# Patient Record
Sex: FEMALE | Race: WHITE | NOT HISPANIC OR LATINO
[De-identification: names, ages, dates, MRNs, and addresses within clinical notes are randomized per-mention and may not be internally consistent; named-entity substitution may affect disease eponyms.]

---

## 2021-05-04 PROBLEM — Z00.00 ENCOUNTER FOR PREVENTIVE HEALTH EXAMINATION: Status: ACTIVE | Noted: 2021-05-04

## 2021-06-01 ENCOUNTER — APPOINTMENT (OUTPATIENT)
Dept: HEART AND VASCULAR | Facility: CLINIC | Age: 81
End: 2021-06-01
Payer: MEDICARE

## 2021-06-01 VITALS
WEIGHT: 140 LBS | TEMPERATURE: 98.3 F | HEART RATE: 83 BPM | BODY MASS INDEX: 27.48 KG/M2 | SYSTOLIC BLOOD PRESSURE: 154 MMHG | HEIGHT: 60 IN | DIASTOLIC BLOOD PRESSURE: 78 MMHG | OXYGEN SATURATION: 97 %

## 2021-06-01 PROCEDURE — 93280 PM DEVICE PROGR EVAL DUAL: CPT

## 2021-08-02 ENCOUNTER — APPOINTMENT (OUTPATIENT)
Dept: HEART AND VASCULAR | Facility: CLINIC | Age: 81
End: 2021-08-02
Payer: MEDICARE

## 2021-08-02 ENCOUNTER — NON-APPOINTMENT (OUTPATIENT)
Age: 81
End: 2021-08-02

## 2021-08-02 PROCEDURE — 93294 REM INTERROG EVL PM/LDLS PM: CPT

## 2021-08-02 PROCEDURE — 93296 REM INTERROG EVL PM/IDS: CPT

## 2021-08-16 RX ORDER — LEVOTHYROXINE SODIUM 0.05 MG/1
50 TABLET ORAL
Refills: 0 | Status: ACTIVE | COMMUNITY

## 2021-08-16 RX ORDER — AMLODIPINE BESYLATE 5 MG/1
5 TABLET ORAL
Refills: 0 | Status: ACTIVE | COMMUNITY

## 2021-08-16 RX ORDER — MULTIVIT-MIN/FOLIC/VIT K/LYCOP 400-300MCG
25 MCG TABLET ORAL
Refills: 0 | Status: ACTIVE | COMMUNITY

## 2021-08-16 NOTE — HISTORY OF PRESENT ILLNESS
[de-identified] : 82 yo female with hypertension, hyperlipidemia, prediabetes, hypothyroid, atrial fibrillation who underwent pacemaker implant 5/3/2021 for sinus node dysfunction.  She presents for follow-up.  No complaints.  She is feeling better since implant.  No fever or chills.

## 2021-08-16 NOTE — PHYSICAL EXAM
[General Appearance - Well Nourished] : well nourished [General Appearance - In No Acute Distress] : no acute distress [Left Infraclavicular] : left infraclavicular area [Clean] : clean [Dry] : dry [Healing Well] : healing well [Bleeding] : no active bleeding [Purulent Drainage] : no purulent drainage [Erythema] : not erythematous [Warm] : not warm [Tender] : not tender

## 2021-08-16 NOTE — PROCEDURE
[No] : not [Sinus Bradycardia] : sinus bradycardia [See Device Printout] : See device printout [Pacemaker] : pacemaker [de-identified] : [facility] [de-identified] : [id number] [de-identified] : [id number] [de-identified] : 05/03/2021

## 2021-11-01 ENCOUNTER — NON-APPOINTMENT (OUTPATIENT)
Age: 81
End: 2021-11-01

## 2021-11-01 ENCOUNTER — APPOINTMENT (OUTPATIENT)
Dept: HEART AND VASCULAR | Facility: CLINIC | Age: 81
End: 2021-11-01
Payer: MEDICARE

## 2021-11-01 PROCEDURE — 93294 REM INTERROG EVL PM/LDLS PM: CPT | Mod: NC

## 2021-11-01 PROCEDURE — 93296 REM INTERROG EVL PM/IDS: CPT

## 2021-12-07 ENCOUNTER — APPOINTMENT (OUTPATIENT)
Dept: HEART AND VASCULAR | Facility: CLINIC | Age: 81
End: 2021-12-07
Payer: MEDICARE

## 2021-12-07 VITALS
OXYGEN SATURATION: 98 % | WEIGHT: 138 LBS | TEMPERATURE: 98.7 F | SYSTOLIC BLOOD PRESSURE: 160 MMHG | BODY MASS INDEX: 27.09 KG/M2 | HEART RATE: 78 BPM | HEIGHT: 60 IN | DIASTOLIC BLOOD PRESSURE: 70 MMHG

## 2021-12-07 PROCEDURE — 99213 OFFICE O/P EST LOW 20 MIN: CPT | Mod: 25

## 2021-12-07 PROCEDURE — 93280 PM DEVICE PROGR EVAL DUAL: CPT

## 2022-02-22 NOTE — PHYSICAL EXAM
[General Appearance - In No Acute Distress] : no acute distress [General Appearance - Well Nourished] : well nourished [Left Infraclavicular] : left infraclavicular area [Clean] : clean [Dry] : dry [Well-Healed] : well-healed [Bleeding] : no active bleeding [Purulent Drainage] : no purulent drainage [Erythema] : not erythematous [Warm] : not warm [Tender] : not tender

## 2022-02-22 NOTE — HISTORY OF PRESENT ILLNESS
[None] : The patient complains of no symptoms [de-identified] : 80 yo female with hypertension, hyperlipidemia, prediabetes, hypothyroid, atrial fibrillation who underwent pacemaker implant 5/3/2021 for sinus node dysfunction.  She presents for follow-up.  No complaints\par Atrial threshold is slightly increased since last visit.  Otherwise normal pacemaker function.  No AF since last check.

## 2022-03-08 ENCOUNTER — APPOINTMENT (OUTPATIENT)
Dept: HEART AND VASCULAR | Facility: CLINIC | Age: 82
End: 2022-03-08
Payer: MEDICARE

## 2022-03-08 ENCOUNTER — NON-APPOINTMENT (OUTPATIENT)
Age: 82
End: 2022-03-08

## 2022-03-08 PROCEDURE — 93294 REM INTERROG EVL PM/LDLS PM: CPT

## 2022-03-08 PROCEDURE — 93296 REM INTERROG EVL PM/IDS: CPT

## 2022-06-07 ENCOUNTER — APPOINTMENT (OUTPATIENT)
Dept: HEART AND VASCULAR | Facility: CLINIC | Age: 82
End: 2022-06-07
Payer: MEDICARE

## 2022-06-07 VITALS
HEIGHT: 60 IN | SYSTOLIC BLOOD PRESSURE: 145 MMHG | DIASTOLIC BLOOD PRESSURE: 70 MMHG | OXYGEN SATURATION: 98 % | BODY MASS INDEX: 26.9 KG/M2 | TEMPERATURE: 97.8 F | WEIGHT: 137 LBS | HEART RATE: 80 BPM

## 2022-06-07 PROCEDURE — 93280 PM DEVICE PROGR EVAL DUAL: CPT

## 2022-06-07 PROCEDURE — 99213 OFFICE O/P EST LOW 20 MIN: CPT | Mod: 25

## 2022-06-07 NOTE — PHYSICAL EXAM
[General Appearance - Well Nourished] : well nourished [General Appearance - In No Acute Distress] : no acute distress [Heart Rate And Rhythm] : heart rate and rhythm were normal [Heart Sounds] : normal S1 and S2 [Murmurs] : no murmurs present [Respiration, Rhythm And Depth] : normal respiratory rhythm and effort [Auscultation Breath Sounds / Voice Sounds] : lungs were clear to auscultation bilaterally [Left Infraclavicular] : left infraclavicular area [Clean] : clean [Dry] : dry [Well-Healed] : well-healed [Abdomen Soft] : soft [Abdomen Tenderness] : non-tender [Erythema] : not erythematous [Warm] : not warm [Tender] : not tender

## 2022-06-07 NOTE — HISTORY OF PRESENT ILLNESS
[None] : The patient complains of no symptoms [de-identified] : 80 yo female with hypertension, hyperlipidemia, prediabetes, hypothyroid, atrial fibrillation who underwent pacemaker implant 5/3/2021 for sinus node dysfunction.  She presents for follow-up.  She notes feeling tired sometimes, sometimes not having energy to do stuff and sometimes getting tired more easily.  She denies palpitations, dizziness, or syncope.\par Pacemaker interrogation today shows normal function and no recent arrhythmias.

## 2022-06-07 NOTE — REVIEW OF SYSTEMS
[Feeling Fatigued] : feeling fatigued [Fever] : no fever [Chills] : no chills [SOB] : no shortness of breath [Dyspnea on exertion] : not dyspnea during exertion [Syncope] : no syncope [Abdominal Pain] : no abdominal pain [Nausea] : no nausea

## 2022-09-06 ENCOUNTER — APPOINTMENT (OUTPATIENT)
Dept: HEART AND VASCULAR | Facility: CLINIC | Age: 82
End: 2022-09-06

## 2022-09-06 ENCOUNTER — NON-APPOINTMENT (OUTPATIENT)
Age: 82
End: 2022-09-06

## 2022-09-06 PROCEDURE — 93294 REM INTERROG EVL PM/LDLS PM: CPT

## 2022-09-06 PROCEDURE — 93296 REM INTERROG EVL PM/IDS: CPT

## 2022-12-12 ENCOUNTER — NON-APPOINTMENT (OUTPATIENT)
Age: 82
End: 2022-12-12

## 2022-12-12 ENCOUNTER — APPOINTMENT (OUTPATIENT)
Dept: HEART AND VASCULAR | Facility: CLINIC | Age: 82
End: 2022-12-12

## 2022-12-12 PROCEDURE — 93296 REM INTERROG EVL PM/IDS: CPT

## 2022-12-12 PROCEDURE — 93294 REM INTERROG EVL PM/LDLS PM: CPT

## 2023-03-13 ENCOUNTER — APPOINTMENT (OUTPATIENT)
Dept: HEART AND VASCULAR | Facility: CLINIC | Age: 83
End: 2023-03-13
Payer: MEDICARE

## 2023-03-13 ENCOUNTER — NON-APPOINTMENT (OUTPATIENT)
Age: 83
End: 2023-03-13

## 2023-03-13 PROCEDURE — 93296 REM INTERROG EVL PM/IDS: CPT

## 2023-03-13 PROCEDURE — 93294 REM INTERROG EVL PM/LDLS PM: CPT

## 2023-05-29 ENCOUNTER — TRANSCRIPTION ENCOUNTER (OUTPATIENT)
Age: 83
End: 2023-05-29

## 2023-06-06 ENCOUNTER — APPOINTMENT (OUTPATIENT)
Dept: HEART AND VASCULAR | Facility: CLINIC | Age: 83
End: 2023-06-06
Payer: MEDICARE

## 2023-06-06 VITALS
SYSTOLIC BLOOD PRESSURE: 155 MMHG | OXYGEN SATURATION: 98 % | WEIGHT: 122 LBS | HEIGHT: 60 IN | BODY MASS INDEX: 23.95 KG/M2 | TEMPERATURE: 98.7 F | HEART RATE: 97 BPM | DIASTOLIC BLOOD PRESSURE: 60 MMHG

## 2023-06-06 DIAGNOSIS — Z95.0 PRESENCE OF CARDIAC PACEMAKER: ICD-10-CM

## 2023-06-06 PROCEDURE — 99214 OFFICE O/P EST MOD 30 MIN: CPT | Mod: 25

## 2023-06-06 PROCEDURE — 93280 PM DEVICE PROGR EVAL DUAL: CPT

## 2023-06-06 RX ORDER — FLECAINIDE ACETATE 100 MG/1
100 TABLET ORAL
Refills: 0 | Status: DISCONTINUED | COMMUNITY
End: 2023-06-06

## 2023-06-07 PROBLEM — Z95.0 PACEMAKER: Status: ACTIVE | Noted: 2021-08-16

## 2023-06-13 NOTE — REVIEW OF SYSTEMS
[Fever] : no fever [Chills] : no chills [Feeling Fatigued] : not feeling fatigued [SOB] : no shortness of breath [Dyspnea on exertion] : not dyspnea during exertion [Syncope] : no syncope [Abdominal Pain] : no abdominal pain [Nausea] : no nausea

## 2023-06-13 NOTE — ADDENDUM
[FreeTextEntry1] : I, Francesca Wilhelm, am scribing for and the presence of Dr. Araujo the following sections: HPI, PMH,Family/social history, ROS, Physical Exam, Assessment / Plan. \par \par I, Sejal Araujo, personally performed the services described in the documentation, reviewed the documentation recorded by the scribe in my presence and it accurately and completely records my words and actions.

## 2023-06-13 NOTE — HISTORY OF PRESENT ILLNESS
[None] : The patient complains of no symptoms [de-identified] : 84 yo female with hypertension, hyperlipidemia, prediabetes, hypothyroid, atrial fibrillation who underwent pacemaker implant 5/3/2021 for sinus node dysfunction.  She presents for follow-up. She reports a recent hospital admission for Afib with RVR on 5/27/23.  She was having palpitations associated with mild dizziness and her pulse ox was a reading a high HR in the 130-140s.  While in the hospital, flecainide was stopped and they were started on Cardizem with improvement of her symptoms.  She also reports several months of digestive problems leading to a 15Ib weight loss for which she is currently seeing a GI specialist.  Due to her weight loss her Eliquis was adjusted to the lower dose.  Since discharge she is feeling well without recurrence of symptoms.  Denies palpitations, dizziness, syncope, SOB.  \par \par Echocardiogram 5/31/23: EF 62%, no significant valve disease\par NST 5/29/23: normal myocardial perfusion scan with no evidence of infarction or inducible ischemia, EF 81%.

## 2023-08-02 ENCOUNTER — APPOINTMENT (OUTPATIENT)
Dept: SURGERY | Facility: CLINIC | Age: 83
End: 2023-08-02
Payer: MEDICARE

## 2023-08-02 VITALS
SYSTOLIC BLOOD PRESSURE: 146 MMHG | TEMPERATURE: 98.4 F | HEIGHT: 60 IN | WEIGHT: 122 LBS | BODY MASS INDEX: 23.95 KG/M2 | HEART RATE: 89 BPM | DIASTOLIC BLOOD PRESSURE: 70 MMHG

## 2023-08-02 PROCEDURE — 99203 OFFICE O/P NEW LOW 30 MIN: CPT

## 2023-08-02 RX ORDER — DILTIAZEM HYDROCHLORIDE 120 MG/1
120 CAPSULE, EXTENDED RELEASE ORAL
Refills: 0 | Status: DISCONTINUED | COMMUNITY
End: 2023-08-02

## 2023-08-02 RX ORDER — APIXABAN 2.5 MG/1
2.5 TABLET, FILM COATED ORAL
Refills: 0 | Status: ACTIVE | COMMUNITY

## 2023-08-02 RX ORDER — PNV NO.95/FERROUS FUM/FOLIC AC 28MG-0.8MG
TABLET ORAL
Refills: 0 | Status: ACTIVE | COMMUNITY

## 2023-08-02 RX ORDER — SIMVASTATIN 10 MG/1
10 TABLET, FILM COATED ORAL
Refills: 0 | Status: ACTIVE | COMMUNITY

## 2023-08-02 NOTE — PLAN
[FreeTextEntry1] : - preop clearance with PCP  - book for OR with 23 hour hold to restart Eliquis post-op

## 2023-08-02 NOTE — ASSESSMENT
[FreeTextEntry1] : 84 yo F w/ biliary colic, likely chronic cholecystitis as seen on imaging and chronic diarrhea.  Risks, benefits and alternatives of robotic cholecystectomy were described.  Risks include but are not limited to infection, bleeding, injury to nearby structures, and adverse reaction to anesthesia.  We discussed that the chronic diarrhea may not be due to her gallbladder pathology and may not completely resolve after surgery.  Post operative activity restrictions include no heavy lifting or core activities/straining for 14 days. Routine daily activities can be resumed in 1-2 days and light activity (such as walking for exercise or jogging) can be resumed in 7 days. Driving can be resumed when pain level is felt to be manageable. No swimming in pool or hot tub for 1 week post operatively. Return to work is generally in 7-10 days, but is individualized according to the patient. They understand the risks benefits and alternatives of the proposed surgery and would like to proceed.

## 2023-08-02 NOTE — REVIEW OF SYSTEMS
[As Noted in HPI] : as noted in HPI [Abdominal Pain] : abdominal pain [Diarrhea] : diarrhea [Negative] : Respiratory

## 2023-08-02 NOTE — CONSULT LETTER
[Dear  ___] : Dear  [unfilled], [( Thank you for referring [unfilled] for consultation for _____ )] : Thank you for referring [unfilled] for consultation for [unfilled] [Please see my note below.] : Please see my note below. [Consult Closing:] : Thank you very much for allowing me to participate in the care of this patient.  If you have any questions, please do not hesitate to contact me. [FreeTextEntry3] : Shruthi Hardy

## 2023-08-02 NOTE — HISTORY OF PRESENT ILLNESS
[de-identified] : Biliary colic and chronic diarrhea  [de-identified] : 82 yo F w/ h/o a-fib on eliquis here with chronic diarrhea since January and episode of abdominal pain +gas.  Seen by Dr. Shah and started on low fiber diet.  Metformin discontinued with some improvement in diarrhea.  She describes some RUQ pain that is non-specific.   US initially concerning for GB mass, however no mass identified with CT imaging.  US 7/25: BG filled with intermediate echogenicity material which demonstrates increased internal vascular flow suspicious for gallbladder mass. CT A/P 7/28:  GB with layering concentrated bile/sludge and stones.  Suggestion of a 0.4 cm stone in region of cystic duct.  Pericholecystic fat stranding and borderline wall thickening.  Mildly enlarged right sided diaphragmatic lymph nodes and some prominent nodes in gastrohepatic ligament and liz which may represent reactive changes.

## 2023-08-02 NOTE — PHYSICAL EXAM
[Normal Breath Sounds] : Normal breath sounds [Normal Rate and Rhythm] : normal rate and rhythm [No Rash or Lesion] : No rash or lesion [Alert] : alert [Calm] : calm [JVD] : no jugular venous distention  [de-identified] : soft, mildly tender RUQ

## 2023-08-15 ENCOUNTER — APPOINTMENT (OUTPATIENT)
Dept: SURGERY | Facility: HOSPITAL | Age: 83
End: 2023-08-15

## 2023-08-15 ENCOUNTER — TRANSCRIPTION ENCOUNTER (OUTPATIENT)
Age: 83
End: 2023-08-15

## 2023-08-15 ENCOUNTER — RESULT REVIEW (OUTPATIENT)
Age: 83
End: 2023-08-15

## 2023-08-16 ENCOUNTER — RESULT REVIEW (OUTPATIENT)
Age: 83
End: 2023-08-16

## 2023-08-29 ENCOUNTER — APPOINTMENT (OUTPATIENT)
Dept: SURGERY | Facility: CLINIC | Age: 83
End: 2023-08-29
Payer: MEDICARE

## 2023-08-29 VITALS — SYSTOLIC BLOOD PRESSURE: 134 MMHG | DIASTOLIC BLOOD PRESSURE: 71 MMHG | TEMPERATURE: 98.2 F | HEART RATE: 76 BPM

## 2023-08-29 DIAGNOSIS — K80.50 CALCULUS OF BILE DUCT W/OUT CHOLANGITIS OR CHOLECYSTITIS W/OUT OBSTRUCTION: ICD-10-CM

## 2023-08-29 PROCEDURE — 99024 POSTOP FOLLOW-UP VISIT: CPT

## 2023-08-29 NOTE — PHYSICAL EXAM
[JVD] : no jugular venous distention  [Normal Breath Sounds] : Normal breath sounds [Normal Rate and Rhythm] : normal rate and rhythm [No Rash or Lesion] : No rash or lesion [Alert] : alert [Calm] : calm [de-identified] : incisions well healed

## 2023-08-29 NOTE — HISTORY OF PRESENT ILLNESS
[de-identified] : s/p robotic cholecystectomy  Path: chronic cholecystitis [de-identified] : Doing well.  tolerating diet.  no complaints

## 2023-09-05 ENCOUNTER — APPOINTMENT (OUTPATIENT)
Dept: HEART AND VASCULAR | Facility: CLINIC | Age: 83
End: 2023-09-05
Payer: MEDICARE

## 2023-09-05 ENCOUNTER — NON-APPOINTMENT (OUTPATIENT)
Age: 83
End: 2023-09-05

## 2023-09-05 PROCEDURE — 93296 REM INTERROG EVL PM/IDS: CPT

## 2023-09-05 PROCEDURE — 93294 REM INTERROG EVL PM/LDLS PM: CPT

## 2023-12-07 ENCOUNTER — APPOINTMENT (OUTPATIENT)
Dept: HEART AND VASCULAR | Facility: CLINIC | Age: 83
End: 2023-12-07
Payer: MEDICARE

## 2023-12-07 ENCOUNTER — NON-APPOINTMENT (OUTPATIENT)
Age: 83
End: 2023-12-07

## 2023-12-07 PROCEDURE — 93294 REM INTERROG EVL PM/LDLS PM: CPT

## 2023-12-07 PROCEDURE — 93296 REM INTERROG EVL PM/IDS: CPT

## 2024-03-10 ENCOUNTER — NON-APPOINTMENT (OUTPATIENT)
Age: 84
End: 2024-03-10

## 2024-03-11 ENCOUNTER — APPOINTMENT (OUTPATIENT)
Dept: HEART AND VASCULAR | Facility: CLINIC | Age: 84
End: 2024-03-11
Payer: MEDICARE

## 2024-03-11 PROCEDURE — 93296 REM INTERROG EVL PM/IDS: CPT

## 2024-03-11 PROCEDURE — 93294 REM INTERROG EVL PM/LDLS PM: CPT

## 2024-06-04 ENCOUNTER — APPOINTMENT (OUTPATIENT)
Dept: HEART AND VASCULAR | Facility: CLINIC | Age: 84
End: 2024-06-04
Payer: MEDICARE

## 2024-06-04 DIAGNOSIS — I48.0 PAROXYSMAL ATRIAL FIBRILLATION: ICD-10-CM

## 2024-06-04 DIAGNOSIS — I49.5 SICK SINUS SYNDROME: ICD-10-CM

## 2024-06-04 PROCEDURE — 99214 OFFICE O/P EST MOD 30 MIN: CPT | Mod: 25

## 2024-06-04 PROCEDURE — 93280 PM DEVICE PROGR EVAL DUAL: CPT

## 2024-06-04 RX ORDER — SOTALOL HYDROCHLORIDE 120 MG/1
TABLET ORAL
Refills: 0 | Status: DISCONTINUED | COMMUNITY
End: 2023-05-27

## 2024-06-04 RX ORDER — METFORMIN HYDROCHLORIDE 500 MG/1
500 TABLET, COATED ORAL
Refills: 0 | Status: DISCONTINUED | COMMUNITY
End: 2024-03-01

## 2024-06-06 PROBLEM — I49.5 SINUS NODE DYSFUNCTION: Status: ACTIVE | Noted: 2021-08-16

## 2024-06-06 RX ORDER — FLECAINIDE ACETATE 100 MG/1
100 TABLET ORAL
Refills: 0 | Status: ACTIVE | COMMUNITY

## 2024-06-12 PROBLEM — I48.0 PAROXYSMAL ATRIAL FIBRILLATION: Status: ACTIVE | Noted: 2022-02-22

## 2024-06-12 NOTE — HISTORY OF PRESENT ILLNESS
[None] : The patient complains of no symptoms [de-identified] : 85 yo female with hypertension, hyperlipidemia, prediabetes, hypothyroid, atrial fibrillation who underwent pacemaker implant 5/3/2021 for sinus node dysfunction.   She was hospitalized for Afib with RVR on 5/27/23 w/ associated palpitations and dizziness.  While in the hospital, flecainide was stopped and started on Cardizem with improvement of her symptoms.  She also reported several months of digestive problems leading to a 15Ib weight loss for which she is currently seeing a GI specialist.  Due to her weight loss her Eliquis was adjusted to the lower dose. She returns for device check.  Since last follow up she underwent Cholecystectomy 8/2023 w/ improved GI symptoms.  She is back on Flecanide 100mg PO BID and tolerating well.  She does have intermittent fatigue/chest discomfot which she associates with a slower HR.  Device function is normal.  AF burden 28%.  Echocardiogram 5/31/23: EF 62%, no significant valve disease NST 5/29/23: normal myocardial perfusion scan with no evidence of infarction or inducible ischemia, EF 81%.

## 2024-10-11 ENCOUNTER — NON-APPOINTMENT (OUTPATIENT)
Age: 84
End: 2024-10-11

## 2024-10-11 ENCOUNTER — APPOINTMENT (OUTPATIENT)
Dept: HEART AND VASCULAR | Facility: CLINIC | Age: 84
End: 2024-10-11
Payer: MEDICARE

## 2024-10-11 ENCOUNTER — APPOINTMENT (OUTPATIENT)
Dept: HEART AND VASCULAR | Facility: CLINIC | Age: 84
End: 2024-10-11

## 2024-10-11 PROCEDURE — 93296 REM INTERROG EVL PM/IDS: CPT

## 2024-10-11 PROCEDURE — 93294 REM INTERROG EVL PM/LDLS PM: CPT

## 2024-10-19 ENCOUNTER — TRANSCRIPTION ENCOUNTER (OUTPATIENT)
Age: 84
End: 2024-10-19

## 2025-01-06 ENCOUNTER — NON-APPOINTMENT (OUTPATIENT)
Age: 85
End: 2025-01-06

## 2025-01-14 ENCOUNTER — NON-APPOINTMENT (OUTPATIENT)
Age: 85
End: 2025-01-14

## 2025-01-14 ENCOUNTER — APPOINTMENT (OUTPATIENT)
Dept: HEART AND VASCULAR | Facility: CLINIC | Age: 85
End: 2025-01-14
Payer: MEDICARE

## 2025-01-14 PROCEDURE — 93296 REM INTERROG EVL PM/IDS: CPT

## 2025-01-14 PROCEDURE — 93294 REM INTERROG EVL PM/LDLS PM: CPT

## 2025-01-21 ENCOUNTER — APPOINTMENT (OUTPATIENT)
Dept: HEART AND VASCULAR | Facility: CLINIC | Age: 85
End: 2025-01-21

## 2025-01-21 VITALS
HEART RATE: 43 BPM | BODY MASS INDEX: 24.84 KG/M2 | SYSTOLIC BLOOD PRESSURE: 130 MMHG | OXYGEN SATURATION: 98 % | WEIGHT: 135 LBS | DIASTOLIC BLOOD PRESSURE: 50 MMHG | HEIGHT: 62 IN

## 2025-01-21 DIAGNOSIS — I48.0 PAROXYSMAL ATRIAL FIBRILLATION: ICD-10-CM

## 2025-01-21 DIAGNOSIS — Z95.0 PRESENCE OF CARDIAC PACEMAKER: ICD-10-CM

## 2025-01-21 DIAGNOSIS — I49.5 SICK SINUS SYNDROME: ICD-10-CM

## 2025-01-21 PROCEDURE — 99214 OFFICE O/P EST MOD 30 MIN: CPT | Mod: 25

## 2025-01-21 PROCEDURE — 93280 PM DEVICE PROGR EVAL DUAL: CPT

## 2025-01-21 RX ORDER — DILTIAZEM HYDROCHLORIDE 30 MG/1
30 TABLET ORAL
Refills: 0 | Status: DISCONTINUED | COMMUNITY
End: 2025-01-21

## 2025-01-21 RX ORDER — METOPROLOL SUCCINATE 25 MG/1
25 TABLET, EXTENDED RELEASE ORAL
Qty: 30 | Refills: 1 | Status: ACTIVE | COMMUNITY
Start: 2025-01-21 | End: 1900-01-01

## 2025-01-24 RX ORDER — DILTIAZEM HYDROCHLORIDE 120 MG/1
120 TABLET, COATED ORAL
Refills: 0 | Status: DISCONTINUED | COMMUNITY
End: 2025-01-24

## 2025-02-11 ENCOUNTER — NON-APPOINTMENT (OUTPATIENT)
Age: 85
End: 2025-02-11

## 2025-02-11 ENCOUNTER — APPOINTMENT (OUTPATIENT)
Dept: HEART AND VASCULAR | Facility: CLINIC | Age: 85
End: 2025-02-11

## 2025-02-11 VITALS
TEMPERATURE: 97.5 F | HEIGHT: 60 IN | DIASTOLIC BLOOD PRESSURE: 78 MMHG | OXYGEN SATURATION: 97 % | BODY MASS INDEX: 26.7 KG/M2 | WEIGHT: 136 LBS | HEART RATE: 75 BPM | SYSTOLIC BLOOD PRESSURE: 183 MMHG

## 2025-02-11 DIAGNOSIS — R10.9 UNSPECIFIED ABDOMINAL PAIN: ICD-10-CM

## 2025-02-11 DIAGNOSIS — I49.5 SICK SINUS SYNDROME: ICD-10-CM

## 2025-02-11 DIAGNOSIS — I48.0 PAROXYSMAL ATRIAL FIBRILLATION: ICD-10-CM

## 2025-02-11 PROCEDURE — 99214 OFFICE O/P EST MOD 30 MIN: CPT

## 2025-02-11 PROCEDURE — 93000 ELECTROCARDIOGRAM COMPLETE: CPT

## 2025-02-11 PROCEDURE — G2211 COMPLEX E/M VISIT ADD ON: CPT

## 2025-02-11 RX ORDER — AMIODARONE HYDROCHLORIDE 200 MG/1
200 TABLET ORAL
Qty: 45 | Refills: 1 | Status: ACTIVE | COMMUNITY
Start: 2025-02-11 | End: 1900-01-01

## 2025-02-13 PROBLEM — R10.9 ABDOMINAL PAIN: Status: ACTIVE | Noted: 2025-02-13

## 2025-03-11 ENCOUNTER — APPOINTMENT (OUTPATIENT)
Dept: HEART AND VASCULAR | Facility: CLINIC | Age: 85
End: 2025-03-11

## 2025-03-11 VITALS
OXYGEN SATURATION: 95 % | SYSTOLIC BLOOD PRESSURE: 182 MMHG | HEIGHT: 60 IN | TEMPERATURE: 98.9 F | BODY MASS INDEX: 26.5 KG/M2 | HEART RATE: 83 BPM | DIASTOLIC BLOOD PRESSURE: 81 MMHG | WEIGHT: 135 LBS

## 2025-03-11 DIAGNOSIS — Z95.0 PRESENCE OF CARDIAC PACEMAKER: ICD-10-CM

## 2025-03-11 DIAGNOSIS — I48.0 PAROXYSMAL ATRIAL FIBRILLATION: ICD-10-CM

## 2025-03-11 DIAGNOSIS — I49.5 SICK SINUS SYNDROME: ICD-10-CM

## 2025-03-11 PROCEDURE — 99214 OFFICE O/P EST MOD 30 MIN: CPT | Mod: 25

## 2025-03-11 PROCEDURE — 93280 PM DEVICE PROGR EVAL DUAL: CPT

## 2025-03-17 ENCOUNTER — RX RENEWAL (OUTPATIENT)
Age: 85
End: 2025-03-17

## 2025-04-22 ENCOUNTER — APPOINTMENT (OUTPATIENT)
Dept: HEART AND VASCULAR | Facility: CLINIC | Age: 85
End: 2025-04-22
Payer: MEDICARE

## 2025-04-22 ENCOUNTER — NON-APPOINTMENT (OUTPATIENT)
Age: 85
End: 2025-04-22

## 2025-04-22 PROCEDURE — 93294 REM INTERROG EVL PM/LDLS PM: CPT

## 2025-04-22 PROCEDURE — 93296 REM INTERROG EVL PM/IDS: CPT

## 2025-04-29 ENCOUNTER — APPOINTMENT (OUTPATIENT)
Dept: HEART AND VASCULAR | Facility: CLINIC | Age: 85
End: 2025-04-29

## 2025-04-29 VITALS
TEMPERATURE: 97.2 F | OXYGEN SATURATION: 98 % | DIASTOLIC BLOOD PRESSURE: 80 MMHG | WEIGHT: 135 LBS | HEART RATE: 85 BPM | SYSTOLIC BLOOD PRESSURE: 146 MMHG | BODY MASS INDEX: 18.9 KG/M2 | HEIGHT: 71 IN

## 2025-04-29 DIAGNOSIS — I49.5 SICK SINUS SYNDROME: ICD-10-CM

## 2025-04-29 DIAGNOSIS — Z95.0 PRESENCE OF CARDIAC PACEMAKER: ICD-10-CM

## 2025-04-29 DIAGNOSIS — I48.0 PAROXYSMAL ATRIAL FIBRILLATION: ICD-10-CM

## 2025-04-29 DIAGNOSIS — I48.92 UNSPECIFIED ATRIAL FLUTTER: ICD-10-CM

## 2025-04-29 PROCEDURE — 99215 OFFICE O/P EST HI 40 MIN: CPT | Mod: 25

## 2025-04-29 PROCEDURE — 93280 PM DEVICE PROGR EVAL DUAL: CPT

## 2025-05-02 PROBLEM — I48.92 ATRIAL FLUTTER: Status: ACTIVE | Noted: 2025-05-02

## 2025-05-02 RX ORDER — DILTIAZEM HYDROCHLORIDE 120 MG/1
120 CAPSULE, COATED, EXTENDED RELEASE ORAL DAILY
Refills: 0 | Status: ACTIVE | COMMUNITY

## 2025-05-05 ENCOUNTER — NON-APPOINTMENT (OUTPATIENT)
Age: 85
End: 2025-05-05

## 2025-05-12 ENCOUNTER — RESULT REVIEW (OUTPATIENT)
Age: 85
End: 2025-05-12

## 2025-05-27 ENCOUNTER — APPOINTMENT (OUTPATIENT)
Dept: HEART AND VASCULAR | Facility: CLINIC | Age: 85
End: 2025-05-27

## 2025-05-27 VITALS
HEART RATE: 73 BPM | DIASTOLIC BLOOD PRESSURE: 62 MMHG | OXYGEN SATURATION: 98 % | BODY MASS INDEX: 27.48 KG/M2 | WEIGHT: 140 LBS | HEIGHT: 60 IN | SYSTOLIC BLOOD PRESSURE: 130 MMHG

## 2025-05-27 DIAGNOSIS — I48.0 PAROXYSMAL ATRIAL FIBRILLATION: ICD-10-CM

## 2025-05-27 DIAGNOSIS — I48.92 UNSPECIFIED ATRIAL FLUTTER: ICD-10-CM

## 2025-05-27 DIAGNOSIS — I42.8 OTHER CARDIOMYOPATHIES: ICD-10-CM

## 2025-05-27 DIAGNOSIS — Z95.0 PRESENCE OF CARDIAC PACEMAKER: ICD-10-CM

## 2025-05-27 DIAGNOSIS — I49.5 SICK SINUS SYNDROME: ICD-10-CM

## 2025-05-27 PROCEDURE — 99214 OFFICE O/P EST MOD 30 MIN: CPT | Mod: 25

## 2025-05-27 PROCEDURE — 93280 PM DEVICE PROGR EVAL DUAL: CPT

## 2025-05-28 PROBLEM — I42.8 OTHER CARDIOMYOPATHY: Status: ACTIVE | Noted: 2025-05-28

## 2025-05-28 RX ORDER — FUROSEMIDE 40 MG/1
40 TABLET ORAL
Qty: 30 | Refills: 0 | Status: ACTIVE | COMMUNITY
Start: 2025-05-02

## 2025-06-17 ENCOUNTER — APPOINTMENT (OUTPATIENT)
Dept: HEART AND VASCULAR | Facility: CLINIC | Age: 85
End: 2025-06-17

## 2025-08-12 ENCOUNTER — APPOINTMENT (OUTPATIENT)
Dept: HEART AND VASCULAR | Facility: CLINIC | Age: 85
End: 2025-08-12

## 2025-08-12 VITALS
HEART RATE: 84 BPM | OXYGEN SATURATION: 95 % | BODY MASS INDEX: 28.47 KG/M2 | SYSTOLIC BLOOD PRESSURE: 160 MMHG | HEIGHT: 60 IN | WEIGHT: 145 LBS | DIASTOLIC BLOOD PRESSURE: 80 MMHG

## 2025-08-12 PROCEDURE — 99204 OFFICE O/P NEW MOD 45 MIN: CPT | Mod: 25

## 2025-08-12 PROCEDURE — 93280 PM DEVICE PROGR EVAL DUAL: CPT
